# Patient Record
Sex: FEMALE | Race: WHITE | NOT HISPANIC OR LATINO | Employment: FULL TIME | URBAN - METROPOLITAN AREA
[De-identification: names, ages, dates, MRNs, and addresses within clinical notes are randomized per-mention and may not be internally consistent; named-entity substitution may affect disease eponyms.]

---

## 2021-04-07 ENCOUNTER — OFFICE VISIT (OUTPATIENT)
Dept: FAMILY MEDICINE | Facility: CLINIC | Age: 26
End: 2021-04-07
Payer: COMMERCIAL

## 2021-04-07 VITALS
SYSTOLIC BLOOD PRESSURE: 135 MMHG | HEART RATE: 66 BPM | RESPIRATION RATE: 16 BRPM | BODY MASS INDEX: 30.29 KG/M2 | OXYGEN SATURATION: 98 % | TEMPERATURE: 97.6 F | DIASTOLIC BLOOD PRESSURE: 83 MMHG | WEIGHT: 193 LBS | HEIGHT: 67 IN

## 2021-04-07 DIAGNOSIS — J30.2 SEASONAL ALLERGIC RHINITIS, UNSPECIFIED TRIGGER: Primary | ICD-10-CM

## 2021-04-07 DIAGNOSIS — R09.82 POST-NASAL DRIP: ICD-10-CM

## 2021-04-07 PROCEDURE — 99203 OFFICE O/P NEW LOW 30 MIN: CPT | Mod: GC | Performed by: STUDENT IN AN ORGANIZED HEALTH CARE EDUCATION/TRAINING PROGRAM

## 2021-04-07 ASSESSMENT — MIFFLIN-ST. JEOR: SCORE: 1655.68

## 2021-04-07 NOTE — PROGRESS NOTES
Assessment and Plan     Post-Nasal Drip with Seasonal Alelrgies:  1 month of continual upper throat discomfort, significant rhinorrhea and congestion which wakes her at night to clear her sinuses. No concern for GERD. Is smoking THC multiple times/day, which may be contributing.   - Claritin-D   - follow-up 1 week if not improving. Consider flonase and montelukast at that time      Options for treatment and follow-up care were reviewed with the patient and/or guardian. Kathy Jimenes and/or guardian engaged in the decision making process and verbalized understanding of the options discussed and agreed with the final plan.    Nain Harper DO, SABINA  [facility], PGY-2    Precepted patient with Dr. Kulwinder Siegel       HPI:   Kathy Jimenes is a 25 year old female who presents to clinic today for   Chief Complaint   Patient presents with     Throat Problem     started over a month ago, feels like there is something stuck in throat or swollen, was seen in ED, gotten better, now worse again, trouble swallowing     Nasal Congestion     runny nose more requently with throat issue     Medication Reconciliation     Sore Throat:  - 1 month, got to point where couldn't swallow, went to ED negative work up and attributed to LAD or other non emergent.   - stayed the same since ER  - runny nose, hurts to swallow sometimes. More of an uncomfortable sensation.  - sometimes painful when swallowing food but not water.  - Had COVID 3-4 months ago (sore throat, runny nose, loss taste/smell, headaches, dizzy)      Denies Fever, Chest Pain, shortness of breath , changes in vision/hearing/GI//diet, abdominal pain, numbness/tingling, or any other concerns.         PMHX:   Active Problems List  There is no problem list on file for this patient.      Current Medications  No current outpatient medications on file.       Social History  Social History     Tobacco Use      "Smoking status: Never Smoker     Smokeless tobacco: Never Used   Substance Use Topics     Alcohol use: Yes     Comment: ocassionally     Drug use: Yes     Types: Marijuana     Comment: ocassionally     History   Drug Use     Types: Marijuana     Comment: ocassionally       Family History  Family History   Problem Relation Age of Onset     Diabetes Father      Heart Disease No family hx of      Hypertension No family hx of      Asthma No family hx of      Cancer No family hx of        Allergies  No Known Allergies         Physical Exam:     Vitals:    04/07/21 1036   BP: 135/83   BP Location: Right arm   Cuff Size: Adult Regular   Pulse: 66   Resp: 16   Temp: 97.6  F (36.4  C)   TempSrc: Oral   SpO2: 98%   Weight: 87.5 kg (193 lb)   Height: 1.69 m (5' 6.54\")     Body mass index is 30.65 kg/m .    GENERAL APPEARANCE: alert, appears stated age, no acute distress  HEENT: Eyes grossly normal to inspection,, and mouth and throat without erythema, ulcers, or lesions. Mild rhinorrhea, sounds congested when speaking. No bumps or lumps over neck.  RESP: lungs clear to auscultation - no rales, rhonchi, or wheezes  CV: regular rate and rhythm, no murmur, click, rub, or gallop  MSK: no lower extremity edema  SKIN: no suspicious lesions or rashes   NEURO: mentation appears intact and speech normal  PSYCH: mood and affect normal/bright       "

## 2021-04-14 NOTE — PROGRESS NOTES
Preceptor Attestation:  Patient's case reviewed and discussed with Conor Harper MD resident and I evaluated the patient. I agree with written assessment and plan of care.  Supervising Physician:  Kulwinder Siegel MD, MD [facility]  [facility]

## 2021-04-19 ENCOUNTER — OFFICE VISIT (OUTPATIENT)
Dept: FAMILY MEDICINE | Facility: CLINIC | Age: 26
End: 2021-04-19
Payer: COMMERCIAL

## 2021-04-19 VITALS
OXYGEN SATURATION: 99 % | DIASTOLIC BLOOD PRESSURE: 81 MMHG | HEIGHT: 66 IN | SYSTOLIC BLOOD PRESSURE: 122 MMHG | BODY MASS INDEX: 30.05 KG/M2 | HEART RATE: 79 BPM | WEIGHT: 187 LBS | TEMPERATURE: 98 F

## 2021-04-19 DIAGNOSIS — Z23 IMMUNIZATION DUE: ICD-10-CM

## 2021-04-19 DIAGNOSIS — J02.9 SORE THROAT: ICD-10-CM

## 2021-04-19 DIAGNOSIS — R09.82 POST-NASAL DRIP: Primary | ICD-10-CM

## 2021-04-19 PROCEDURE — 90471 IMMUNIZATION ADMIN: CPT | Performed by: STUDENT IN AN ORGANIZED HEALTH CARE EDUCATION/TRAINING PROGRAM

## 2021-04-19 PROCEDURE — 90715 TDAP VACCINE 7 YRS/> IM: CPT | Performed by: STUDENT IN AN ORGANIZED HEALTH CARE EDUCATION/TRAINING PROGRAM

## 2021-04-19 PROCEDURE — 99213 OFFICE O/P EST LOW 20 MIN: CPT | Mod: GC | Performed by: STUDENT IN AN ORGANIZED HEALTH CARE EDUCATION/TRAINING PROGRAM

## 2021-04-19 ASSESSMENT — MIFFLIN-ST. JEOR: SCORE: 1625.36

## 2021-04-19 NOTE — PROGRESS NOTES
Assessment and Plan     Sore throat:   Ongoing x1 month with associated difficulty swallowing at times with solids. No complications with fluids. Some associated rhinorrhea. Tenderness to upper neck and submandibular region but no signs of systemic infections. No thyroid enlargement noted. Differential includes strep, mononucleosis, allergies, smoking irritation, GERD, vs infectious etiologies or mechanical causes such as strictures or tumors.   - Continue Claritin. New prescription given today.  - No hx of GERD but given episodic features, start PPI.  - Stop smoking. Discussed how inhalation of any sort of combustible can be contributory to her symptoms.   - Follow up in 2 weeks. If symptoms persist, may consider swallow study for further evaluation.  - Consider getting TSH baseline given family hx of thyroid complications.    Options for treatment and follow-up care were reviewed with the patient and/or guardian. Kathy Jimenes and/or guardian engaged in the decision making process and verbalized understanding of the options discussed and agreed with the final plan.    I was present with the medical student who participated in the service and in the documentation of this note. I have verified the history and personally performed the physical exam and medical decision making, and have verified the content of the note, which accurately reflects my assessment of the patient and the plan of care.   MD Ricardo Holt (MS3)    Nain Harper DO, MBA  [facility], PGY-2    Precepted patient with Dr. Valencia Mendoza       HPI:   Kathy Jimenes is a 25 year old female who presents to clinic today for an ongoing sore throat.    Sore throat has been ongoing for over 1 month now. Located primarily in the upper neck area and submandibular region. No radiation of pain. 6/10 up to a 7/10 when swallowing, dull in nature, and creates some  anxiety from having to think about it all the time. Symptoms worse in the evening. Decreased appetitie from fear of getting food stuck. Feels like some food can get stuck and had to drink more to get it down this morning. Associated rhinorrhea, swelling with some difficult swallowing. Over the last month, has gotten slightly better. Feels like it's cleared some what. Pain, soreness and irritation is still present. Claritin previously prescribed did help with the swallowing and rhinorrhea but nothing else. Did take as prescribed. Tried mucinex prior to the Claritin with no help. No ear pain, no headache, no changes in vision, no oral pain, or weakness. No changes in weight the last year.    Smokes marijuana at least once a day. No alcohol or any other substances. Hx of covid 3 months ago. Not on contraceptives. Not currently sexually active. Family hx of thyroid complications in her mother and sisters.    Denies Fever, Chest Pain, shortness of breath , changes in vision/hearing/GI//diet, abdominal pain, numbness/tingling, or any other concerns.         PMHX:   Active Problems List  There is no problem list on file for this patient.      Current Medications  Current Outpatient Medications   Medication Sig Dispense Refill     loratadine-pseudoePHEDrine (CLARITIN-D 24-HOUR)  MG 24 hr tablet Take 1 tablet by mouth daily 14 tablet 1     omeprazole (PRILOSEC) 20 MG DR capsule Take 1 capsule (20 mg) by mouth daily 30 capsule 1       Social History  Social History     Tobacco Use     Smoking status: Never Smoker     Smokeless tobacco: Never Used   Substance Use Topics     Alcohol use: Yes     Comment: ocassionally     Drug use: Yes     Types: Marijuana     Comment: ocassionally     History   Drug Use     Types: Marijuana     Comment: ocassionally       Family History  Family History   Problem Relation Age of Onset     Diabetes Father      Heart Disease No family hx of      Hypertension No family hx of      Asthma No  "family hx of      Cancer No family hx of        Allergies  No Known Allergies         Physical Exam:     Vitals:    04/19/21 0907   BP: 122/81   Pulse: 79   Temp: 98  F (36.7  C)   TempSrc: Oral   SpO2: 99%   Weight: 84.8 kg (187 lb)   Height: 1.685 m (5' 6.34\")     Body mass index is 29.87 kg/m .    GENERAL APPEARANCE: alert, appears stated age, no acute distress  HEENT: Eyes grossly normal to inspection, nares normal, and mouth and throat without erythema, ulcers, or lesions. No thyromegaly. No lymphadenopathy. Minimal tenderness to upper neck and submandibular region. Clear voice when speaking.  RESP: lungs clear to auscultation - no rales, rhonchi, or wheezes  CV: regular rate and rhythm, no murmur, click, rub, or gallop  ABDOMEN: soft, nontender   MSK: extremities normal, no gross deformities noted, no lower extremity edema  SKIN: no suspicious lesions or rashes   NEURO: Normal strength and tone, sensory exam grossly normal, mentation appears intact and speech normal  PSYCH: mood and affect normal/bright       "

## 2021-04-21 NOTE — PROGRESS NOTES
Preceptor Attestation:  Patient's case reviewed and discussed with the resident, Conor Harper MD, and I personally evaluated the patient. I agree with written assessment and plan of care.    Supervising Physician:  Valencia Mendoza MD   [facility]

## 2021-05-21 ENCOUNTER — OFFICE VISIT (OUTPATIENT)
Dept: FAMILY MEDICINE | Facility: CLINIC | Age: 26
End: 2021-05-21
Payer: COMMERCIAL

## 2021-05-21 VITALS
OXYGEN SATURATION: 98 % | WEIGHT: 195.8 LBS | HEIGHT: 67 IN | RESPIRATION RATE: 16 BRPM | DIASTOLIC BLOOD PRESSURE: 77 MMHG | HEART RATE: 56 BPM | SYSTOLIC BLOOD PRESSURE: 111 MMHG | BODY MASS INDEX: 30.73 KG/M2

## 2021-05-21 DIAGNOSIS — R09.82 POST-NASAL DRIP: Primary | ICD-10-CM

## 2021-05-21 DIAGNOSIS — R22.1 NECK SWELLING: ICD-10-CM

## 2021-05-21 PROCEDURE — 99203 OFFICE O/P NEW LOW 30 MIN: CPT | Performed by: FAMILY MEDICINE

## 2021-05-21 RX ORDER — FLUTICASONE PROPIONATE 50 MCG
1-2 SPRAY, SUSPENSION (ML) NASAL DAILY
Qty: 16 G | Refills: 2 | Status: SHIPPED | OUTPATIENT
Start: 2021-05-21 | End: 2021-12-30

## 2021-05-21 ASSESSMENT — MIFFLIN-ST. JEOR: SCORE: 1668.38

## 2021-05-21 NOTE — PATIENT INSTRUCTIONS
Try using sour candies to suck on when swelling occurs.  Try Nasal spray at bedtime      Patient Education     Salivary Gland Swelling, Uncertain Cause  Salivary glands make saliva in response to food in your mouth. Saliva is mostly water. It also has minerals and proteins that help break down food and keep the mouth and teeth healthy. There are 3 pairs of salivary glands:     Parotid glands. In front of the ear.    Submandibular glands. Below the jaw.    Sublingual glands. Below the tongue.  Each gland has a tube (duct) that carries saliva from the gland into the mouth.    The salivary glands can sometimes get swollen. Causes can include:     Viral infection (such as childhood mumps)    Bacterial infections    Sjögren syndrome    Diabetes    Malnutrition    Sarcoidosis    Blocked salivary duct (from stones or tumors)  Certain medicines can affect salivary flow. This can lead to swollen glands. Tell your healthcare provider about all of the medicines you take.   Tests are being done to find the cause of the swelling. These may include blood tests, X-ray, ultrasound, CT scan, or injecting dye into the duct to look for blockage. Treatment depends on the exact cause of the swelling.   Home care    If the area is painful, you can take over-the-counter medicines, such as acetaminophen or ibuprofen, unless you were prescribed another medicine. Wetting a cloth with warm water and putting it over the affected gland for 10 to 15 minutes at a time can also help ease pain.    To help prevent blockages and infections:  ? Drink 6 to 8 glasses of fluid per day (such as water, tea, and clear soup) to keep well-hydrated.  ? If you smoke, ask your healthcare provider for help to quit. Smoking makes salivary gland stones more likely.  ? Maintain good dental hygiene. Brush and floss your teeth daily. See your dentist for regular cleanings.    Follow-up care  Follow up with your healthcare provider, or as advised. See your healthcare  provider for further exams and testing. If you have been referred to a specialist, make an appointment right away.   When to get medical advice  Call your healthcare provider if any of the following occur:    More pain or swelling in the gland    Inability to open mouth or pain when opening mouth    Fever of 100.4 F (38 C) or higher, or as directed by your provider    Redness over the gland    Fluid (pus) draining into the mouth    Trouble breathing or swallowing    Any new symptoms  Prevention  Here are steps you can take to help prevent an infection:    Keep good handwashing habits.    Don t have close contact with people who have sore throats, colds, or other upper respiratory infections.    Don t smoke, and stay away from secondhand smoke.    Stay up to date with of your vaccines.  [employer] last reviewed this educational content on 3/1/2020    4237-7245 [employer]. All rights reserved. This information is not intended as a substitute for professional medical care. Always follow your healthcare professional's instructions.         Referral for :     US Head Neck    LOCATION/PLACE/Provider :    [facility]   DATE & TIME :    Faxed referral, location will call   PHONE :     [phone]  FAX :    [phone]  Appointment made by clinic staff/:    Nicol

## 2021-05-21 NOTE — PROGRESS NOTES
"    Assessment & Plan     Post-nasal drip  Continue Claritin  -Start fluticasone (FLONASE) 50 MCG/ACT nasal spray; Spray 1-2 sprays into both nostrils daily    Neck swelling  Discussion this could be anxiety/stress, salivary gland issue or other soft tissue concern. Since has been ongoing symptoms from 2/2021, will obtain imaging of the area. Do suspect is due to post nasal drip  - US Head Neck Soft Tissue             BMI:   Estimated body mass index is 31.1 kg/m  as calculated from the following:    Height as of this encounter: 1.69 m (5' 6.54\").    Weight as of this encounter: 88.8 kg (195 lb 12.8 oz).           Return in about 24 days (around 6/14/2021) for Physical Exam/PAP.    Mary Ann Sutton DO [facility]    Patrica Chavez is a 25 year old who presents for the following health issues     HPI     1. Sore throat: has been seen in clinic for throat pain on 4/7 and 4/19 and was seen in ED on 3/14/21 for similar symptoms. She returns today with no improvement. Has tried Claritin-D and PPI. Does not have typical seasonal allergies. Does not smoke nicotine, has not used THC in 2 weeks. The sensation occurs most days with symptoms worse toward the end of the day. She sometimes has difficulty swallowing food and food may even get stuck (but this does not happen as often). She does not feel a lump or bump but does admit to swelling mostly noted at the edge of her jaw.  There is a history of thyroid issues in the family.        Objective    /77   Pulse 56   Resp 16   Ht 1.69 m (5' 6.54\")   Wt 88.8 kg (195 lb 12.8 oz)   SpO2 98%   BMI 31.10 kg/m    Body mass index is 31.1 kg/m .  Physical Exam   GENERAL: healthy, alert and no distress  EYES: Eyes grossly normal to inspection, PERRL and conjunctivae and sclerae normal  HENT: ear canals and TM's normal, nose and mouth without ulcers or lesions  NECK: no adenopathy, no asymmetry, masses, or scars and thyroid normal to " palpation  RESP: lungs clear to auscultation - no rales, rhonchi or wheezes  CV: regular rate and rhythm, normal S1 S2, no S3 or S4, no murmur, click or rub, no peripheral edema and peripheral pulses strong  ABDOMEN: soft, nontender, no hepatosplenomegaly, no masses and bowel sounds normal  MS: no gross musculoskeletal defects noted, no edema

## 2021-05-24 ENCOUNTER — RECORDS - HEALTHEAST (OUTPATIENT)
Dept: ADMINISTRATIVE | Facility: OTHER | Age: 26
End: 2021-05-24

## 2021-05-27 ENCOUNTER — HOSPITAL ENCOUNTER (OUTPATIENT)
Dept: ULTRASOUND IMAGING | Facility: HOSPITAL | Age: 26
Discharge: HOME | End: 2021-05-27
Attending: FAMILY MEDICINE
Payer: COMMERCIAL

## 2021-05-27 DIAGNOSIS — R22.1 NECK SWELLING: ICD-10-CM

## 2021-05-31 ENCOUNTER — RECORDS - HEALTHEAST (OUTPATIENT)
Dept: ADMINISTRATIVE | Facility: CLINIC | Age: 26
End: 2021-05-31

## 2021-06-03 ENCOUNTER — HOSPITAL ENCOUNTER (EMERGENCY)
Dept: EMERGENCY MEDICINE | Facility: HOSPITAL | Age: 26
Discharge: HOME | End: 2021-06-03
Attending: STUDENT IN AN ORGANIZED HEALTH CARE EDUCATION/TRAINING PROGRAM
Payer: COMMERCIAL

## 2021-06-03 DIAGNOSIS — R07.0 THROAT PAIN: ICD-10-CM

## 2021-06-03 DIAGNOSIS — J01.90 ACUTE SINUSITIS WITH SYMPTOMS > 10 DAYS: ICD-10-CM

## 2021-06-03 LAB
DEPRECATED S PYO AG THROAT QL EIA: NORMAL
GROUP A STREP BY PCR: NORMAL

## 2021-06-03 ASSESSMENT — MIFFLIN-ST. JEOR: SCORE: 1656.26

## 2021-06-17 ENCOUNTER — OFFICE VISIT - HEALTHEAST (OUTPATIENT)
Dept: OTOLARYNGOLOGY | Facility: CLINIC | Age: 26
End: 2021-06-17

## 2021-06-17 DIAGNOSIS — K21.9 LPRD (LARYNGOPHARYNGEAL REFLUX DISEASE): ICD-10-CM

## 2021-06-17 DIAGNOSIS — J02.9 SORE THROAT: ICD-10-CM

## 2021-06-17 DIAGNOSIS — R09.A2 GLOBUS SENSATION: ICD-10-CM

## 2021-06-18 ENCOUNTER — HOSPITAL ENCOUNTER (OUTPATIENT)
Dept: ULTRASOUND IMAGING | Facility: HOSPITAL | Age: 26
Discharge: HOME | End: 2021-06-18
Attending: OTOLARYNGOLOGY
Payer: COMMERCIAL

## 2021-06-18 DIAGNOSIS — J02.9 SORE THROAT: ICD-10-CM

## 2021-06-25 NOTE — ED TRIAGE NOTES
Pt reports sore throat, swelling feeling. Since march. Has had 4-5 appointments to be evaluated. Reports soreness is now to sinuses. Painful when she swallows. Pt takes tylenol for pain. Told it may be allergies and allergy medications aren't helping. Omeprazole is not working.     [facility] is where she has been getting seen. Has not been strep swabbed per pt.

## 2021-06-25 NOTE — ED NOTES
See providers note for assessment. Patient seen by provider and discharged prior to nursing assessment.

## 2021-06-26 NOTE — PROGRESS NOTES
CHIEF COMPLAINT:     HPI     Sore Throat      Additional comments: Had CT          Last edited by Carrol Zhang CMA on 2021  8:01 AM. (History)           Throat concern      HISTORY OF PRESENT ILLNESS    Kathy Jimenes   was seen   for Globus Sensation    Patient states that symptoms have been going on since March of this year.  Alleviating factors include Motrin.  She complains of sore throat sometimes headache and is swelling and tenderness around the around the submandibular area and thyroid area.  She has some difficulty swallowing.  She was placed on a 2-week course of omeprazole which she took for 3 weeks which did help slightly.  RSI today equals 10.  She denies cough.  She does have some difficulty swallowing and some tenderness around the neck.  She did also was treated with a course of Augmentin which did not help.  She is also had ultrasound of the neck which did not show any significant adenopathy.  She has no history of neck trauma or neck pain she drinks soda rarely.  Spicy foods rarely    Recent ED visit:  6/3/2021      FINAL IMPRESSION:  1. Throat pain    2. Acute sinusitis with symptoms > 10 days          ED COURSE & MEDICAL DECISION MAKIN y.o. old female who presents to the ED for evaluation of throat pain.   History and physical examination as documented. Vital signs reassuring.      Patient has had at least 2 months of the sensation that something is draining down her throat, states now she is having some more pain in the region of her anterior cervical lymph node chain.  She does have a little bit of tenderness over the trachea but is afebrile and I think this would be very atypical presentation for something serious like bacterial tracheitis.  She has absolutely no signs of epiglottitis, retropharyngeal abscess, or any airway concerns. Rapid strep was negative and was ordered from triage.  She has no signs of strep pharyngitis on exam.  She is already tried treating this  as postnasal drip and addressing allergic type symptoms.  Has had no relief from this.      She is also on omeprazole for possible GERD as the etiology - no change with this either.  Since she has some new pain and a little bit of facial tenderness/headaches and has not yet tried antibiotics and think would be reasonable to try a course of Augmentin for possible acute sinusitis.  Although this is not the typical presentation, could potentially benefit her so we will try a course of Augmentin.  I did recommend close f/u with ENT and provided a referral. Patient was ok with plan for discharge. Given return precautions and discharged in stable condition.           REVIEW OF SYSTEMS    Review of Systems: a 10-system review is reviewed at this encounter.  See scanned document.         PHYSICAL EXAM:        HEAD: Normal appearance and symmetry:  No cutaneous lesions.      EARS:    Normal TM's bilaterally. Normal auditory canals and external ears. Non-tender.         NOSE:    Dorsum:   straight  Septum: normal  Mucosa:  moist  Inferior turbinates:  normal       ORAL CAVITY/OROPHARYNX:    Lips:  Normal.  Tongue: normal, midline  Mucosa:   no lesions  Tonsils:  1+      NECK:  Trachea:  midline.   Thyroid:  normal   Adenopathy:  none       NEURO:   Alert and Oriented       RESPIRATORY:   Symmetry and Respiratory effort    PSYCH:   normal mood and affect    SKIN:  warm and dry         FLEX LARYNGOSCOPY:    After obtaining consent, a flexible laryngoscope is used to examine both nasal cavities, the nasopharynx, pharnx, and larynx.      Nasal cavity: nl  NP: 3 2-3+ adenoid hypertrophy  Pharnx: Edematous   Larynx: Posterior commissure is inflamed and thickened          IMPRESSION:    Encounter Diagnoses   Name Primary?     Sore throat Yes     Globus sensation      LPRD (laryngopharyngeal reflux disease)        RECOMMENDATIONS:    Orders Placed This Encounter   Procedures     US Thyroid     Throat pain     Standing Status:    Future     Standing Expiration Date:   6/17/2022     Order Specific Question:   Is the patient pregnant?     Answer:   No     Order Specific Question:   Can the procedure be changed per Radiologist protocol?     Answer:   Yes      Medications Ordered   Medications     pantoprazole (PROTONIX) 40 MG tablet     Sig: Take 1 tablet (40 mg total) by mouth daily. 30 minutes before breakfast     Dispense:  30 tablet     Refill:  2      Patient has physical exam findings consistent with upper airway reflux.  Recommend reflux diet and a prolonged course of PPI medications.  Return in 8 weeks.  There is some tenderness to her thyroid so we will go ahead and order thyroid ultrasound.  We will get her signed up for my chart today.  See back in 8 weeks all questions were answered she is agreeable this plan of care

## 2021-06-26 NOTE — ED PROVIDER NOTES
EMERGENCY DEPARTMENT ENCOUNTER      NAME: Kathy Jimenes  AGE: 25 y.o. female  YOB: 1995  MRN: [id number]  EVALUATION DATE & TIME: 6/3/2021  7:50 PM    PCP: Provider, No Primary Care    ED PROVIDER: Mandy Perry M.D.    Chief Complaint   Patient presents with     Sore Throat       FINAL IMPRESSION:  1. Throat pain    2. Acute sinusitis with symptoms > 10 days        ED COURSE & MEDICAL DECISION MAKIN y.o. old female who presents to the ED for evaluation of throat pain.   History and physical examination as documented. Vital signs reassuring.     Patient has had at least 2 months of the sensation that something is draining down her throat, states now she is having some more pain in the region of her anterior cervical lymph node chain.  She does have a little bit of tenderness over the trachea but is afebrile and I think this would be very atypical presentation for something serious like bacterial tracheitis.  She has absolutely no signs of epiglottitis, retropharyngeal abscess, or any airway concerns. Rapid strep was negative and was ordered from triage.  She has no signs of strep pharyngitis on exam.  She is already tried treating this as postnasal drip and addressing allergic type symptoms.  Has had no relief from this.  She is also on omeprazole for possible GERD as the etiology - no change with this either.  Since she has some new pain and a little bit of facial tenderness/headaches and has not yet tried antibiotics and think would be reasonable to try a course of Augmentin for possible acute sinusitis.  Although this is not the typical presentation, could potentially benefit her so we will try a course of Augmentin.  I did recommend close f/u with ENT and provided a referral. Patient was ok with plan for discharge. Given return precautions and discharged in stable condition.    Scribe time stamps  8:19 PM I met the patient and performed my initial interview and exam.    0 minutes of critical  care time     MEDICATIONS GIVEN IN THE EMERGENCY:  Medications - No data to display    NEW PRESCRIPTIONS STARTED AT TODAY'S ER VISIT  Discharge Medication List as of 6/3/2021  8:42 PM      START taking these medications    Details   amoxicillin-clavulanate (AUGMENTIN) 875-125 mg per tablet Take 1 tablet by mouth every 12 (twelve) hours for 14 days., Starting Thu 6/3/2021, Until Thu 6/17/2021, Normal              =================================================================    HPI    Patient information was obtained from: patient    Use of : N/A     Kathy Jimenes is a 25 y.o. female with no pertinent history on file who presents to this ED by walk in for evaluation of sore throat.     Patient reports that she has been struggling with throat pain since March. At first, the patient had swelling, troubles swallowing, and discomfort in her throat. Now she feels some pain and says it radiates up her neck and into her sinuses. Patient rates pain as 8/10. She notes associated dizziness and headache. She has never taken antibiotics for this before. Patient does note that she had cobblestone throat in elementary school and was treated with antibiotics at that time. Denies hearing problems, cough, chest pain, shortness of breath, vomit, nausea, diarrhea, rash, or any other complaints at this time.    REVIEW OF SYSTEMS   Review of Systems   HENT: Positive for sore throat and trouble swallowing. Negative for hearing loss.    Respiratory: Negative for cough and shortness of breath.    Cardiovascular: Negative for chest pain.   Gastrointestinal: Negative for diarrhea, nausea and vomiting.   Skin: Negative for rash.   Neurological: Positive for dizziness and headaches.   All other systems reviewed and are negative.     PAST MEDICAL HISTORY:  History reviewed. No pertinent past medical history.    PAST SURGICAL HISTORY:  History reviewed. No pertinent surgical history.    CURRENT MEDICATIONS:    No current  "facility-administered medications on file prior to encounter.      No current outpatient medications on file prior to encounter.       ALLERGIES:  Allergies   Allergen Reactions     Nickel Rash       FAMILY HISTORY:  History reviewed. No pertinent family history.    SOCIAL HISTORY:   Social History     Socioeconomic History     Marital status: Single     Spouse name: None     Number of children: None     Years of education: None     Highest education level: None   Occupational History     None   Social Needs     Financial resource strain: None     Food insecurity     Worry: None     Inability: None     Transportation needs     Medical: None     Non-medical: None   Tobacco Use     Smoking status: None   Substance and Sexual Activity     Alcohol use: None     Drug use: None     Sexual activity: None   Lifestyle     Physical activity     Days per week: None     Minutes per session: None     Stress: None   Relationships     Social connections     Talks on phone: None     Gets together: None     Attends Latter day service: None     Active member of club or organization: None     Attends meetings of clubs or organizations: None     Relationship status: None     Intimate partner violence     Fear of current or ex partner: None     Emotionally abused: None     Physically abused: None     Forced sexual activity: None   Other Topics Concern     None   Social History Narrative     None       VITALS:  Patient Vitals for the past 24 hrs:   BP Temp Temp src Pulse Resp SpO2 Height Weight   06/03/21 1940 (!) 182/93 97.7  F (36.5  C) Temporal 75 18 100 % 5' 6\" (1.676 m) 195 lb (88.5 kg)       PHYSICAL EXAM    General: No acute distress.  HEENT: No external signs of head trauma. Posterior oropharynx clear and moist. Mild tenderness over trachea and anterior cervical lymph node chain  Chest/Pulm: Lungs clear to auscultation bilaterally. No respiratory distress. Breathing comfortably on room air.   CV: Regular rate and rhythm without " murmurs. Warm and well perfused.   Abdomen: Soft and non-distended without tenderness to palpation. No peritoneal signs.   MSK/Extremities: Actively moving all four extremities. No pedal edema.  Skin: No visible rashes. Not diaphoretic.   Neuro: Alert and conversant. GCS 15.  Psych: Behavior normal.    LAB:  All pertinent labs reviewed and interpreted.  Results for orders placed or performed during the hospital encounter of 06/03/21   Rapid strep screen    Specimen: Throat   Result Value Ref Range    Rapid Strep A Antigen No Group A Strep detected, presumptive negative No Group A Strep detected, presumptive negative       I, Jassi Gomez, am serving as a scribe to document services personally performed by Dr. Perry based on my observation and the provider's statements to me. I, Mandy Perry MD attest that Jassi Gomez is acting in a scribe capacity, has observed my performance of the services and has documented them in accordance with my direction.    Mandy Perry M.D.  Emergency Medicine  [facility] EMERGENCY DEPARTMENT  [address] [address]  Dept: [phone]  Loc: [phone]     Mandy Perry MD  06/03/21 6411

## 2021-06-26 NOTE — PATIENT INSTRUCTIONS - HE
Protonix as directed  Return visit 8 weeks        Lifestyle changes:    Avoid eating 2-3 hours before bedtime.   You may find it helpful to elevate the head of your bed.     Avoid following foods that are likely to trigger acid reflux:    Coffee or tea (try LOW ACID coffee or herbal tea)  Anything that s fizzy or has caffeine in it  Alcohol   Citrus fruits, such as oranges and lissa  Tomato based foods (salsa, pizza, lasanga)  Chocolate   Mint or peppermint  Fatty foods (ice cream)  Spicy foods  Onions and garlic

## 2021-07-06 VITALS — WEIGHT: 195 LBS | HEIGHT: 66 IN | BODY MASS INDEX: 31.34 KG/M2

## 2021-12-30 ENCOUNTER — VIRTUAL VISIT (OUTPATIENT)
Dept: FAMILY MEDICINE | Facility: CLINIC | Age: 26
End: 2021-12-30
Payer: COMMERCIAL

## 2021-12-30 DIAGNOSIS — Z20.822 SUSPECTED 2019 NOVEL CORONAVIRUS INFECTION: Primary | ICD-10-CM

## 2021-12-30 PROCEDURE — 99212 OFFICE O/P EST SF 10 MIN: CPT | Mod: 95 | Performed by: NURSE PRACTITIONER

## 2021-12-30 NOTE — PATIENT INSTRUCTIONS
"Discharge Instructions for COVID-19 Patients  You have--or may have--COVID-19. Please follow the instructions listed below.   If you have a weakened immune system, discuss with your doctor any other actions you need to take.  How can I protect others?  If you have symptoms (fever, cough, body aches or trouble breathing):    Stay home and away from others (self-isolate) until:  ? Your other symptoms have resolved (gotten better). And   ? You've had no fever--and no medicine that reduces fever--for 1 full day (24 hours). And   ? At least 10 days have passed since your symptoms started. (You may need to wait 20 days. Follow the advice of your care team.)  If you don't show symptoms, but testing showed that you have COVID-19:    Stay home and away from others (self-isolate) until at least 10 days have passed since the date of your first positive COVID-19 test.  During this time    Stay in your own room, even for meals. Use your own bathroom if you can.    Stay away from others in your home. No hugging, kissing or shaking hands. No visitors.    Don't go to work, school or anywhere else.    Clean \"high touch\" surfaces often (doorknobs, counters, handles). Use household cleaning spray or wipes.    You'll find a full list of  on the EPA website: www.epa.gov/pesticide-registration/list-n-disinfectants-use-against-sars-cov-2.    Cover your mouth and nose with a mask or other face covering to avoid spreading germs.    Wash your hands and face often. Use soap and water.    Caregivers in these groups are at risk for severe illness due to COVID-19:  ? People 65 years and older  ? People who live in a nursing home or long-term care facility  ? People with chronic disease (lung, heart, cancer, diabetes, kidney, liver, immunologic)  ? People who have a weakened immune system, including those who:    Are in cancer treatment    Take medicine that weakens the immune system, such as corticosteroids    Had a bone marrow or organ " transplant    Have an immune deficiency    Have poorly controlled HIV or AIDS    Are obese (body mass index of 40 or higher)    Smoke regularly    Caregivers should wear gloves while washing dishes, handling laundry and cleaning bedrooms and bathrooms.    Use caution when washing and drying laundry: Don't shake dirty laundry and use the warmest water setting that you can.    For more tips on managing your health at home, go to www.cdc.gov/coronavirus/2019-ncov/downloads/10Things.pdf.  How can I take care of myself at home?  1. Get lots of rest. Drink extra fluids (unless a doctor has told you not to).  2. Take Tylenol (acetaminophen) for fever or pain. If you have liver or kidney problems, ask your family doctor if it's okay to take Tylenol.   Adults can take either:   ? 650 mg (two 325 mg pills) every 4 to 6 hours, or   ? 1,000 mg (two 500 mg pills) every 8 hours as needed.  ? Note: Don't take more than 3,000 mg in one day. Acetaminophen is found in many medicines (both prescribed and over-the-counter medicines). Read all labels to be sure you don't take too much.   For children, check the Tylenol bottle for the right dose. The dose is based on the child's age or weight.  3. If you have other health problems (like cancer, heart failure, an organ transplant or severe kidney disease): Call your specialty clinic if you don't feel better in the next 2 days.  4. Know when to call 911. Emergency warning signs include:  ? Trouble breathing or shortness of breath  ? Pain or pressure in the chest that doesn't go away  ? Feeling confused like you haven't felt before, or not being able to wake up  ? Bluish-colored lips or face  5. Your doctor may have prescribed a blood thinner medicine. Follow their instructions.  Where can I get more information?     [facility] - About COVID-19:   [email or web address]    CDC - What to Do If You're Sick:  www.cdc.gov/coronavirus/2019-ncov/about/steps-when-sick.html    CDC - Ending Home Isolation: www.cdc.gov/coronavirus/2019-ncov/hcp/disposition-in-home-patients.html    CDC - Caring for Someone: www.cdc.gov/coronavirus/2019-ncov/if-you-are-sick/care-for-someone.html    [facility] - Interim Guidance for Hospital Discharge to Home: www.health.[facility].[place]./diseases/coronavirus/hcp/hospdischarge.pdf    Below are the COVID-19 hotlines at the [place] Department of Health ([facility]). Interpreters are available.  ? For health questions: Call 412-568-7448 or 1-378.234.1155 (7 a.m. to 7 p.m.)  ? For questions about schools and childcare: Call 356-724-3026 or 1-346.895.9201 (7 a.m. to 7 p.m.)    For informational purposes only. Not to replace the advice of your health care provider. Clinically reviewed by Dr. Ernie Lopez.   Copyright   2020 [facility]. All rights reserved. [employer] 104455 - REV 01/05/21.    COVID-19 testing at [facility] is by appointment only. You'll need to schedule a time to get tested. If you have symptoms (signs) of COVID, please log in to [employer] to complete an e-visit (virtual visit). This is the first step to getting tested.    If you don't have COVID symptoms and want to get tested, you should also log in to [employer] for an e-visit. This includes people who:    have had close contact with a COVID-positive person    want to be tested before or after travel    have taken part in high-risk activities    have a school testing mandate, or     were told to get tested by their care team or the health department.     A [employer] e-visit is the fastest way for you to be seen by our care team. Please choose  Next available provider  to complete an e-visit. When you choose this option, the average response time is less than one hour.  After the e-visit, you'll be able to self-schedule your test at one of our testing locations. To learn more about our testing locations or for other details, please visit  our COVID-19 Resource Hub.    [employer] is also the fastest way to get your test results. You'll get your results in [employer] within 3 days. If you don't use [employer], you'll get your results in the mail in 7 to 10 days. If your test is positive and you don't view your result in [employer] within 1 business day, you'll get a phone call with your result. A positive result means that you have COVID-19.    If you have an upcoming procedure at [facility], you'll need to be tested for COVID. The test needs to happen 2 to 4 days before your procedure. If you have an upcoming procedure, we will contact you to schedule a COVID test.    If you don't have a [employer] account, please call [phone] to set up a virtual visit. You can also find community testing sites in [place] at [place]/get-tested/testing-locations. If you live in [place], please visit www.[place]hs.[email or web address]/covid-19/community-testing.htm.

## 2021-12-30 NOTE — PROGRESS NOTES
"Kathy is a 26 year old who is being evaluated via a billable video visit.      How would you like to obtain your AVS? MyChart  If the video visit is dropped, the invitation should be resent by: [phone]  Will anyone else be joining your video visit? No      Video Start Time: unable to connect    Assessment & Plan     Suspected 2019 novel coronavirus infection  Mild symptoms. Quarantine, symptomatic care advised.  - Symptomatic; Yes; 12/28/2021 COVID-19 Virus (Coronavirus) by PCR Nose; Future       BMI:   Estimated body mass index is 31.47 kg/m  as calculated from the following:    Height as of 6/3/21: 1.676 m (5' 6\").    Weight as of 6/3/21: 88.5 kg (195 lb).       Patient Instructions   Discharge Instructions for COVID-19 Patients  You have--or may have--COVID-19. Please follow the instructions listed below.   If you have a weakened immune system, discuss with your doctor any other actions you need to take.  How can I protect others?  If you have symptoms (fever, cough, body aches or trouble breathing):    Stay home and away from others (self-isolate) until:  ? Your other symptoms have resolved (gotten better). And   ? You've had no fever--and no medicine that reduces fever--for 1 full day (24 hours). And   ? At least 10 days have passed since your symptoms started. (You may need to wait 20 days. Follow the advice of your care team.)  If you don't show symptoms, but testing showed that you have COVID-19:    Stay home and away from others (self-isolate) until at least 10 days have passed since the date of your first positive COVID-19 test.  During this time    Stay in your own room, even for meals. Use your own bathroom if you can.    Stay away from others in your home. No hugging, kissing or shaking hands. No visitors.    Don't go to work, school or anywhere else.    Clean \"high touch\" surfaces often (doorknobs, counters, handles). Use household cleaning spray or wipes.    You'll find a full list of  on " the EPA website: www.epa.gov/pesticide-registration/list-n-disinfectants-use-against-sars-cov-2.    Cover your mouth and nose with a mask or other face covering to avoid spreading germs.    Wash your hands and face often. Use soap and water.    Caregivers in these groups are at risk for severe illness due to COVID-19:  ? People 65 years and older  ? People who live in a nursing home or long-term care facility  ? People with chronic disease (lung, heart, cancer, diabetes, kidney, liver, immunologic)  ? People who have a weakened immune system, including those who:    Are in cancer treatment    Take medicine that weakens the immune system, such as corticosteroids    Had a bone marrow or organ transplant    Have an immune deficiency    Have poorly controlled HIV or AIDS    Are obese (body mass index of 40 or higher)    Smoke regularly    Caregivers should wear gloves while washing dishes, handling laundry and cleaning bedrooms and bathrooms.    Use caution when washing and drying laundry: Don't shake dirty laundry and use the warmest water setting that you can.    For more tips on managing your health at home, go to www.cdc.gov/coronavirus/2019-ncov/downloads/10Things.pdf.  How can I take care of myself at home?  1. Get lots of rest. Drink extra fluids (unless a doctor has told you not to).  2. Take Tylenol (acetaminophen) for fever or pain. If you have liver or kidney problems, ask your family doctor if it's okay to take Tylenol.   Adults can take either:   ? 650 mg (two 325 mg pills) every 4 to 6 hours, or   ? 1,000 mg (two 500 mg pills) every 8 hours as needed.  ? Note: Don't take more than 3,000 mg in one day. Acetaminophen is found in many medicines (both prescribed and over-the-counter medicines). Read all labels to be sure you don't take too much.   For children, check the Tylenol bottle for the right dose. The dose is based on the child's age or weight.  3. If you have other health problems (like cancer, heart  failure, an organ transplant or severe kidney disease): Call your specialty clinic if you don't feel better in the next 2 days.  4. Know when to call 911. Emergency warning signs include:  ? Trouble breathing or shortness of breath  ? Pain or pressure in the chest that doesn't go away  ? Feeling confused like you haven't felt before, or not being able to wake up  ? Bluish-colored lips or face  5. Your doctor may have prescribed a blood thinner medicine. Follow their instructions.  Where can I get more information?    [facility] - About COVID-19:   [email or web address]    CDC - What to Do If You're Sick: www.cdc.gov/coronavirus/2019-ncov/about/steps-when-sick.html    CDC - Ending Home Isolation: www.cdc.gov/coronavirus/2019-ncov/hcp/disposition-in-home-patients.html    [facility] - Caring for Someone: www.cdc.gov/coronavirus/2019-ncov/if-you-are-sick/care-for-someone.html    [facility] - Interim Guidance for Hospital Discharge to Home: www.health.[facility].[place]./diseases/coronavirus/hcp/hospdischarge.pdf    Below are the COVID-19 hotlines at the [facility] ([facility]). Interpreters are available.  ? For health questions: Call 574-543-9231 or 1-359.935.1647 (7 a.m. to 7 p.m.)  ? For questions about schools and childcare: Call 254-471-5272 or 1-295.704.5372 (7 a.m. to 7 p.m.)    For informational purposes only. Not to replace the advice of your health care provider. Clinically reviewed by Dr. Ernie Lopez.   Copyright   2020 [employer]. All rights reserved. M2G 225661 - REV 01/05/21.    COVID-19 testing at [facility] is by appointment only. You'll need to schedule a time to get tested. If you have symptoms (signs) of COVID, please log in to [employer] to complete an e-visit (virtual visit). This is the first step to getting tested.    If you don't have COVID symptoms and want to get tested, you should also log in to [employer] for an e-visit. This includes people  who:    have had close contact with a COVID-positive person    want to be tested before or after travel    have taken part in high-risk activities    have a school testing mandate, or     were told to get tested by their care team or the health department.     A "[employer]"t e-visit is the fastest way for you to be seen by our care team. Please choose  Next available provider  to complete an e-visit. When you choose this option, the average response time is less than one hour.  After the e-visit, you'll be able to self-schedule your test at one of our testing locations. To learn more about our testing locations or for other details, please visit our COVID-19 Resource Hub.    [employer] is also the fastest way to get your test results. You'll get your results in [employer] within 3 days. If you don't use [employer], you'll get your results in the mail in 7 to 10 days. If your test is positive and you don't view your result in [employer] within 1 business day, you'll get a phone call with your result. A positive result means that you have COVID-19.    If you have an upcoming procedure at [facility], you'll need to be tested for COVID. The test needs to happen 2 to 4 days before your procedure. If you have an upcoming procedure, we will contact you to schedule a COVID test.    If you don't have a [employer] account, please call [phone] to set up a virtual visit. You can also find community testing sites in [place] at [place]/get-tested/testing-locations. If you live in [place], please visit www.dhs.[place].gov/covid-19/community-testing.htm.            Return in about 1 week (around 1/6/2022) for worsening or continued symptoms.    NIR Gonzalez [facility] RACHEL Chavez is a 26 year old who presents for the following health issues     HPI       Concern for COVID-19  About how many days ago did these symptoms start? 12/28/2021  Is this your first visit for this  illness? Yes  In the 14 days before your symptoms started, have you had close contact with someone with COVID-19 (Coronavirus)? Yes, I have been in contact with someone who has COVID-19/Coronavirus (confirmed by lab test).  Do you have a fever or chills? No  Are you having new or worsening difficulty breathing? No  Do you have new or worsening cough? Yes, it's a dry cough.   Have you had any new or unexplained body aches? YES    Have you experienced any of the following NEW symptoms?    Headache: YES    Sore throat: No    Loss of taste or smell: No    Chest pain: No    Diarrhea: No    Rash: No  What treatments have you tried? Dayquil and Nyquil  Who do you live with? Mom, dad, two brothers, and one sister.  Are you, or a household member, a healthcare worker or a ? No  Do you live in a nursing home, group home, or shelter? No  Do you have a way to get food/medications if quarantined? Yes, I have a friend or family member who can help me.              Review of Systems   Constitutional, HEENT, cardiovascular, pulmonary, gi and gu systems are negative, except as otherwise noted.      Objective           Vitals:  No vitals were obtained today due to virtual visit.    Physical Exam   GENERAL: Healthy, alert and no distress  RESP: No distress                Video-Visit Details    Type of service:  Video Visit    Video End Time:unable to connect    Originating Location (pt. Location): Home    Distant Location (provider location):  [facility]     Platform used for Video Visit: [employer]    Telephone visit 5 minutes

## 2022-01-04 ENCOUNTER — LAB (OUTPATIENT)
Dept: FAMILY MEDICINE | Facility: CLINIC | Age: 27
End: 2022-01-04
Attending: NURSE PRACTITIONER
Payer: COMMERCIAL

## 2022-01-04 DIAGNOSIS — Z20.822 SUSPECTED 2019 NOVEL CORONAVIRUS INFECTION: ICD-10-CM

## 2022-01-04 PROCEDURE — U0003 INFECTIOUS AGENT DETECTION BY NUCLEIC ACID (DNA OR RNA); SEVERE ACUTE RESPIRATORY SYNDROME CORONAVIRUS 2 (SARS-COV-2) (CORONAVIRUS DISEASE [COVID-19]), AMPLIFIED PROBE TECHNIQUE, MAKING USE OF HIGH THROUGHPUT TECHNOLOGIES AS DESCRIBED BY CMS-2020-01-R: HCPCS

## 2022-01-04 PROCEDURE — U0005 INFEC AGEN DETEC AMPLI PROBE: HCPCS

## 2022-01-05 LAB — SARS-COV-2 RNA RESP QL NAA+PROBE: POSITIVE
